# Patient Record
Sex: MALE | Race: OTHER | HISPANIC OR LATINO | URBAN - METROPOLITAN AREA
[De-identification: names, ages, dates, MRNs, and addresses within clinical notes are randomized per-mention and may not be internally consistent; named-entity substitution may affect disease eponyms.]

---

## 2017-03-25 ENCOUNTER — EMERGENCY (EMERGENCY)
Facility: HOSPITAL | Age: 32
LOS: 1 days | Discharge: HOME | End: 2017-03-25
Attending: EMERGENCY MEDICINE
Payer: COMMERCIAL

## 2017-03-25 VITALS
HEART RATE: 70 BPM | OXYGEN SATURATION: 98 % | RESPIRATION RATE: 16 BRPM | SYSTOLIC BLOOD PRESSURE: 139 MMHG | DIASTOLIC BLOOD PRESSURE: 72 MMHG

## 2017-03-25 VITALS
RESPIRATION RATE: 16 BRPM | HEART RATE: 77 BPM | DIASTOLIC BLOOD PRESSURE: 98 MMHG | OXYGEN SATURATION: 99 % | SYSTOLIC BLOOD PRESSURE: 158 MMHG | TEMPERATURE: 98 F | WEIGHT: 240.08 LBS | HEIGHT: 69 IN

## 2017-03-25 DIAGNOSIS — M25.512 PAIN IN LEFT SHOULDER: ICD-10-CM

## 2017-03-25 DIAGNOSIS — Z85.47 PERSONAL HISTORY OF MALIGNANT NEOPLASM OF TESTIS: Chronic | ICD-10-CM

## 2017-03-25 DIAGNOSIS — R07.89 OTHER CHEST PAIN: ICD-10-CM
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
## 2017-03-25 PROCEDURE — 96372 THER/PROPH/DIAG INJ SC/IM: CPT

## 2017-03-25 PROCEDURE — 99284 EMERGENCY DEPT VISIT MOD MDM: CPT

## 2017-03-25 PROCEDURE — 73030 X-RAY EXAM OF SHOULDER: CPT

## 2017-03-25 PROCEDURE — 99284 EMERGENCY DEPT VISIT MOD MDM: CPT | Mod: 25

## 2017-03-25 PROCEDURE — 72100 X-RAY EXAM L-S SPINE 2/3 VWS: CPT

## 2017-03-25 PROCEDURE — 93005 ELECTROCARDIOGRAM TRACING: CPT

## 2017-03-25 PROCEDURE — 72100 X-RAY EXAM L-S SPINE 2/3 VWS: CPT | Mod: 26

## 2017-03-25 PROCEDURE — 71020: CPT | Mod: 26

## 2017-03-25 PROCEDURE — 71046 X-RAY EXAM CHEST 2 VIEWS: CPT

## 2017-03-25 PROCEDURE — 73030 X-RAY EXAM OF SHOULDER: CPT | Mod: 26,LT

## 2017-03-25 PROCEDURE — 93010 ELECTROCARDIOGRAM REPORT: CPT

## 2017-03-25 RX ORDER — CYCLOBENZAPRINE HYDROCHLORIDE 10 MG/1
10 TABLET, FILM COATED ORAL ONCE
Qty: 0 | Refills: 0 | Status: COMPLETED | OUTPATIENT
Start: 2017-03-25 | End: 2017-03-25

## 2017-03-25 RX ORDER — IBUPROFEN 200 MG
1 TABLET ORAL
Qty: 18 | Refills: 0 | OUTPATIENT
Start: 2017-03-25 | End: 2017-03-30

## 2017-03-25 RX ORDER — KETOROLAC TROMETHAMINE 30 MG/ML
30 SYRINGE (ML) INJECTION ONCE
Qty: 0 | Refills: 0 | Status: DISCONTINUED | OUTPATIENT
Start: 2017-03-25 | End: 2017-03-25

## 2017-03-25 RX ORDER — METHOCARBAMOL 500 MG/1
2 TABLET, FILM COATED ORAL
Qty: 30 | Refills: 0 | OUTPATIENT
Start: 2017-03-25 | End: 2017-03-30

## 2017-03-25 RX ORDER — IBUPROFEN 200 MG
600 TABLET ORAL ONCE
Qty: 0 | Refills: 0 | Status: DISCONTINUED | OUTPATIENT
Start: 2017-03-25 | End: 2017-03-25

## 2017-03-25 RX ADMIN — Medication 30 MILLIGRAM(S): at 20:06

## 2017-03-25 RX ADMIN — CYCLOBENZAPRINE HYDROCHLORIDE 10 MILLIGRAM(S): 10 TABLET, FILM COATED ORAL at 20:06
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
## 2017-03-25 NOTE — ED PROVIDER NOTE - PROGRESS NOTE DETAILS
recd sign out  xray reviewed, discussed results with patient, recd meds, discussed with patient results, will have increase in pain. meds written he and family unable to decide pharmacy, hence rx written, will find a 24h pharmacy closer to home. follow up w ith medical doctor
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
## 2017-03-25 NOTE — ED PROVIDER NOTE - MUSCULOSKELETAL MINIMAL EXAM
mild tenderness to palpation of left chest and shoulder.  FAROM.  no visible seatbelt sign/normal range of motion/TENDERNESS

## 2017-05-22 ENCOUNTER — EMERGENCY (EMERGENCY)
Facility: HOSPITAL | Age: 32
LOS: 1 days | Discharge: HOME | End: 2017-05-22
Attending: STUDENT IN AN ORGANIZED HEALTH CARE EDUCATION/TRAINING PROGRAM
Payer: COMMERCIAL

## 2017-05-22 VITALS — WEIGHT: 250 LBS | HEIGHT: 69 IN

## 2017-05-22 DIAGNOSIS — Z85.47 PERSONAL HISTORY OF MALIGNANT NEOPLASM OF TESTIS: Chronic | ICD-10-CM

## 2017-05-22 DIAGNOSIS — R51 HEADACHE: ICD-10-CM

## 2017-05-22 DIAGNOSIS — Z88.8 ALLERGY STATUS TO OTHER DRUGS, MEDICAMENTS AND BIOLOGICAL SUBSTANCES STATUS: ICD-10-CM

## 2017-05-22 DIAGNOSIS — Z98.890 OTHER SPECIFIED POSTPROCEDURAL STATES: ICD-10-CM

## 2017-05-22 DIAGNOSIS — Z79.899 OTHER LONG TERM (CURRENT) DRUG THERAPY: ICD-10-CM

## 2017-05-22 PROCEDURE — 99284 EMERGENCY DEPT VISIT MOD MDM: CPT | Mod: 25

## 2017-05-22 NOTE — ED ADULT TRIAGE NOTE - CHIEF COMPLAINT QUOTE
c/o pressure and pain to back of head with a "tingling " sensation to lower lip, pt states it is the "worst headache" he has ever had. pt states he had pain earlier today and took tylenol

## 2017-05-23 VITALS
RESPIRATION RATE: 18 BRPM | TEMPERATURE: 98 F | DIASTOLIC BLOOD PRESSURE: 82 MMHG | SYSTOLIC BLOOD PRESSURE: 121 MMHG | OXYGEN SATURATION: 97 % | HEART RATE: 67 BPM

## 2017-05-23 LAB
ALBUMIN SERPL ELPH-MCNC: 4.1 G/DL — SIGNIFICANT CHANGE UP (ref 3.3–5.2)
ALP SERPL-CCNC: 52 U/L — SIGNIFICANT CHANGE UP (ref 40–120)
ALT FLD-CCNC: 31 U/L — SIGNIFICANT CHANGE UP
ANION GAP SERPL CALC-SCNC: 14 MMOL/L — SIGNIFICANT CHANGE UP (ref 5–17)
AST SERPL-CCNC: 19 U/L — SIGNIFICANT CHANGE UP
BASOPHILS # BLD AUTO: 0 K/UL — SIGNIFICANT CHANGE UP (ref 0–0.2)
BASOPHILS NFR BLD AUTO: 0.1 % — SIGNIFICANT CHANGE UP (ref 0–2)
BILIRUB SERPL-MCNC: 0.3 MG/DL — LOW (ref 0.4–2)
BUN SERPL-MCNC: 20 MG/DL — SIGNIFICANT CHANGE UP (ref 8–20)
CALCIUM SERPL-MCNC: 8.8 MG/DL — SIGNIFICANT CHANGE UP (ref 8.6–10.2)
CHLORIDE SERPL-SCNC: 103 MMOL/L — SIGNIFICANT CHANGE UP (ref 98–107)
CO2 SERPL-SCNC: 24 MMOL/L — SIGNIFICANT CHANGE UP (ref 22–29)
CREAT SERPL-MCNC: 1.04 MG/DL — SIGNIFICANT CHANGE UP (ref 0.5–1.3)
EOSINOPHIL # BLD AUTO: 0.3 K/UL — SIGNIFICANT CHANGE UP (ref 0–0.5)
EOSINOPHIL NFR BLD AUTO: 2.7 % — SIGNIFICANT CHANGE UP (ref 0–6)
GLUCOSE SERPL-MCNC: 115 MG/DL — SIGNIFICANT CHANGE UP (ref 70–115)
HCT VFR BLD CALC: 38.4 % — LOW (ref 42–52)
HGB BLD-MCNC: 13.6 G/DL — LOW (ref 14–18)
LIDOCAIN IGE QN: 24 U/L — SIGNIFICANT CHANGE UP (ref 22–51)
LYMPHOCYTES # BLD AUTO: 1.9 K/UL — SIGNIFICANT CHANGE UP (ref 1–4.8)
LYMPHOCYTES # BLD AUTO: 19.5 % — LOW (ref 20–55)
MCHC RBC-ENTMCNC: 29.1 PG — SIGNIFICANT CHANGE UP (ref 27–31)
MCHC RBC-ENTMCNC: 35.4 G/DL — SIGNIFICANT CHANGE UP (ref 32–36)
MCV RBC AUTO: 82.2 FL — SIGNIFICANT CHANGE UP (ref 80–94)
MONOCYTES # BLD AUTO: 0.8 K/UL — SIGNIFICANT CHANGE UP (ref 0–0.8)
MONOCYTES NFR BLD AUTO: 8.7 % — SIGNIFICANT CHANGE UP (ref 3–10)
NEUTROPHILS # BLD AUTO: 6.7 K/UL — SIGNIFICANT CHANGE UP (ref 1.8–8)
NEUTROPHILS NFR BLD AUTO: 68.8 % — SIGNIFICANT CHANGE UP (ref 37–73)
PLATELET # BLD AUTO: 226 K/UL — SIGNIFICANT CHANGE UP (ref 150–400)
POTASSIUM SERPL-MCNC: 3.6 MMOL/L — SIGNIFICANT CHANGE UP (ref 3.5–5.3)
POTASSIUM SERPL-SCNC: 3.6 MMOL/L — SIGNIFICANT CHANGE UP (ref 3.5–5.3)
PROT SERPL-MCNC: 6.8 G/DL — SIGNIFICANT CHANGE UP (ref 6.6–8.7)
RBC # BLD: 4.67 M/UL — SIGNIFICANT CHANGE UP (ref 4.6–6.2)
RBC # FLD: 12.8 % — SIGNIFICANT CHANGE UP (ref 11–15.6)
SODIUM SERPL-SCNC: 141 MMOL/L — SIGNIFICANT CHANGE UP (ref 135–145)
WBC # BLD: 9.7 K/UL — SIGNIFICANT CHANGE UP (ref 4.8–10.8)
WBC # FLD AUTO: 9.7 K/UL — SIGNIFICANT CHANGE UP (ref 4.8–10.8)

## 2017-05-23 PROCEDURE — 70450 CT HEAD/BRAIN W/O DYE: CPT

## 2017-05-23 PROCEDURE — 83690 ASSAY OF LIPASE: CPT

## 2017-05-23 PROCEDURE — 85027 COMPLETE CBC AUTOMATED: CPT

## 2017-05-23 PROCEDURE — 70450 CT HEAD/BRAIN W/O DYE: CPT | Mod: 26

## 2017-05-23 PROCEDURE — 99284 EMERGENCY DEPT VISIT MOD MDM: CPT | Mod: 25

## 2017-05-23 PROCEDURE — 80053 COMPREHEN METABOLIC PANEL: CPT

## 2017-05-23 RX ORDER — IBUPROFEN 200 MG
1 TABLET ORAL
Qty: 12 | Refills: 0 | OUTPATIENT
Start: 2017-05-23 | End: 2017-05-26

## 2017-05-23 RX ORDER — SODIUM CHLORIDE 9 MG/ML
1000 INJECTION INTRAMUSCULAR; INTRAVENOUS; SUBCUTANEOUS ONCE
Qty: 0 | Refills: 0 | Status: COMPLETED | OUTPATIENT
Start: 2017-05-23 | End: 2017-05-23

## 2017-05-23 RX ORDER — SODIUM CHLORIDE 9 MG/ML
3 INJECTION INTRAMUSCULAR; INTRAVENOUS; SUBCUTANEOUS EVERY 8 HOURS
Qty: 0 | Refills: 0 | Status: DISCONTINUED | OUTPATIENT
Start: 2017-05-23 | End: 2017-05-26

## 2017-05-23 RX ADMIN — SODIUM CHLORIDE 1000 MILLILITER(S): 9 INJECTION INTRAMUSCULAR; INTRAVENOUS; SUBCUTANEOUS at 01:14

## 2017-05-23 NOTE — ED PROVIDER NOTE - PROGRESS NOTE DETAILS
Called CT to get urgent CT given abnormal affect. Pt being transferred at this time. Labs and Ct reviewed with results provided to patient. Patient well appearing with resolution of symptoms. unclear cause to recurrent head sensation.

## 2017-05-23 NOTE — ED ADULT NURSE REASSESSMENT NOTE - NS ED NURSE REASSESS COMMENT FT1
Pt recvd lying on stretcher, denies pain, reports feeling stress, denies HA. Safety and comfort measures in place.

## 2017-05-23 NOTE — ED PROVIDER NOTE - OBJECTIVE STATEMENT
A 32 year old male pt with a hx of prostate CA (treated) presents to the ED c/o headache. The pt reports gradually developing a strong headache in the back of his head yesterday afternoon. The pt took Tylenol which helped his headache but states that the pain has been worsening since. He describes the pain as a strong pressure in the back of his head. He denies any prior hx of headaches and has not had any associated nausea or vomiting. Pt denies any FMHx of migraines or chronic headaches. As per the pt's friend accompanying the pt, he was not acting like his normal self later on in the day. Pt denies any lack of sleep last night. No further complaints at this time. A 32 year old male pt with a hx of testicular CA (treated) presents to the ED c/o headache. The pt reports gradually developing a strong headache in the back of his head yesterday afternoon. The pt took Tylenol which helped his headache but states that the pain has been worsening since. He describes the pain as a strong pressure in the back of his head. He denies any prior hx of headaches and has not had any associated nausea or vomiting. Pt denies any FMHx of migraines or chronic headaches. As per the pt's wife, he has not been acting like himself since yesterday afternoon. Pt denies any lack of sleep last night. No further complaints at this time. A 32 year old male pt with a hx of testicular CA (treated) presents to the ED c/o headache. The pt reports gradually developing  headache in the right occipital region of his head yesterday afternoon. The pt took Tylenol which helped his headache but states that the pain has been worsening since. He describes the pain as a strong pressure in the back of his head. He states after taking tyelnol the headache resolved for a couple of hours then returned. He took tylenol again with similar response. He then took advil when the headache returned. He states it has helped. However denies pains but repeated states symptoms are like a dull pressure. He denies any prior personal or family hx of recurrent headaches and has not had any associated neurologic complaints; nausea, vomiting, paresthesia, weakness, tremors, etc. Given history of testicular cancer patient and wife and concerned of new onset of headache. Currently the patient states his headache has nearly resolved  No further complaints at this time.

## 2017-05-23 NOTE — ED PROVIDER NOTE - MEDICAL DECISION MAKING DETAILS
A 32 year old male pt presents to the ED c/o headache. Will check labs, give fluids, CT head, and re-evaluate.

## 2017-05-23 NOTE — ED PROVIDER NOTE - CHPI ED SYMPTOMS NEG
no vomiting/no nausea no dizziness/no weakness/no blurred vision/no vomiting/no trauma, no travel, no new medication/no numbness/no fever/no loss of consciousness/no confusion/no nausea

## 2017-05-23 NOTE — ED PROVIDER NOTE - NS ED MD SCRIBE ATTENDING SCRIBE SECTIONS
HISTORY OF PRESENT ILLNESS/PHYSICAL EXAM/PAST MEDICAL/SURGICAL/SOCIAL HISTORY/VITAL SIGNS( Pullset)/REVIEW OF SYSTEMS/DISPOSITION/HIV

## 2019-01-07 NOTE — ED ADULT NURSE NOTE - SCORE
Subjective   Patient ID: Paco is a 68 year old male.    Chief Complaint   Patient presents with   • Follow-up     lab results      HPI     Patient here to follow up chronic conditions and lab results. States still with insomnia even though started taking magnesium.     Paco has a past medical history of Back injury.  Paco has Benign essential hypertension; Insomnia; Obesity (BMI 30-39.9); Low libido; and Prediabetes on their problem list.  Paco has a current medication list which includes the following prescription(s): aspirin, amlodipine, hydrochlorothiazide, and metoprolol tartrate.  Paco is allergic to penicillins.    Review of Systems   Respiratory: Negative for shortness of breath.    Cardiovascular: Negative for chest pain.       Objective   Physical Exam   Constitutional: He appears well-developed and well-nourished. No distress.   HENT:   Head: Normocephalic and atraumatic.   Right Ear: External ear normal.   Left Ear: External ear normal.   Skin: He is not diaphoretic.       Patient Active Problem List   Diagnosis   • Benign essential hypertension   • Insomnia   • Obesity (BMI 30-39.9)   • Low libido   • Prediabetes       Assessment   Problem List Items Addressed This Visit        Behavioral    Insomnia     Continue magnesium         Low libido     Psychological condition is unchanged.  Continue current treatment regimen.  Psychological condition will be reassessed in 3 months   Pending testosterone results.            Circulatory    Benign essential hypertension - Primary     Hypertension is improving with treatment.  Continue current treatment regimen.  Blood pressure will be reassessed in 3 months.         Relevant Orders    PROSTATE SPECIFIC ANTIGEN       Digestive    Obesity (BMI 30-39.9)     Obesity is worsening.  Discussed the patient's BMI.  The BMI is above average. The patient received dietary education because they have an above normal BMI..  General weight loss/lifestyle  modification strategies discussed (elicit support from others; identify saboteurs; non-food rewards, etc).               Schedule follow up: in 3 months   1

## 2019-09-01 ENCOUNTER — EMERGENCY (EMERGENCY)
Facility: HOSPITAL | Age: 34
LOS: 1 days | Discharge: HOME | End: 2019-09-01
Attending: EMERGENCY MEDICINE
Payer: COMMERCIAL

## 2019-09-01 VITALS
WEIGHT: 223.11 LBS | SYSTOLIC BLOOD PRESSURE: 131 MMHG | TEMPERATURE: 98 F | HEART RATE: 64 BPM | HEIGHT: 69 IN | DIASTOLIC BLOOD PRESSURE: 64 MMHG | RESPIRATION RATE: 18 BRPM | OXYGEN SATURATION: 98 %

## 2019-09-01 DIAGNOSIS — Z85.47 PERSONAL HISTORY OF MALIGNANT NEOPLASM OF TESTIS: Chronic | ICD-10-CM

## 2019-09-01 PROCEDURE — 12041 INTMD RPR N-HF/GENIT 2.5CM/<: CPT

## 2019-09-01 PROCEDURE — 99283 EMERGENCY DEPT VISIT LOW MDM: CPT | Mod: 25

## 2019-09-01 PROCEDURE — 73130 X-RAY EXAM OF HAND: CPT | Mod: 26,LT

## 2019-09-01 PROCEDURE — 73130 X-RAY EXAM OF HAND: CPT

## 2019-09-01 RX ORDER — IBUPROFEN 200 MG
600 TABLET ORAL ONCE
Refills: 0 | Status: COMPLETED | OUTPATIENT
Start: 2019-09-01 | End: 2019-09-01

## 2019-09-01 RX ORDER — AZTREONAM 2 G
1 VIAL (EA) INJECTION
Qty: 14 | Refills: 0
Start: 2019-09-01 | End: 2019-09-07

## 2019-09-01 RX ADMIN — Medication 1 TABLET(S): at 21:10

## 2019-09-01 RX ADMIN — Medication 600 MILLIGRAM(S): at 20:29

## 2019-09-01 NOTE — ED STATDOCS - CLINICAL SUMMARY MEDICAL DECISION MAKING FREE TEXT BOX
Laceration to left hand, neuro vascular intact. Check xray, clean out laceration, explore wound, lac repair

## 2019-09-01 NOTE — ED STATDOCS - ATTENDING CONTRIBUTION TO CARE
I, Deann Cardoso, performed a face to face bedside interview with this patient regarding history of present illness, review of symptoms and relevant past medical, social and family history.  I completed an independent physical examination. Medical decision making, follow-up on ordered tests (ie labs, radiologic studies) and re-evaluation of the patient's status has been communicated to the ACP.  Disposition of the patient will be based on test outcome and response to ED interventions.

## 2019-09-01 NOTE — ED STATDOCS - OBJECTIVE STATEMENT
33y/o M presents to the ED s/p cutting frozen burgers with a knife c/o laceration to left hand. Pt is able to move all extremities, bleeding controlled. Tetanus UTD. No additional complaints at this time.

## 2019-09-01 NOTE — ED STATDOCS - PHYSICAL EXAMINATION
Gen: NAD, AOx3  Head: NCAT  MSK: No edema, no visible deformities, full range of motion in all digits of left hand. normal strength of left hand and left fingers. Sensation intact.  Neuro: CN II-XII grossly intact, No focal neurologic deficits  Skin: Laceration to left hand between 1st and 2nd digit not actively bleeding.  Psych: normal affect Gen: NAD, AOx3  Head: NCAT  MSK: No edema, no visible deformities, full range of motion in all digits of left hand. normal strength of left hand and left fingers. Sensation intact.  Neuro: No focal neurologic deficits  Skin: deep Laceration to left hand between 1st and 2nd digit not actively bleeding, no tendon involvement upon wound exploration.  Psych: normal affect

## 2019-09-01 NOTE — ED ADULT NURSE NOTE - OBJECTIVE STATEMENT
pt A&OX4, c/o lac to left hand from knife, pt states that he was trying to break apart frozen hamburgers with a knife and knife slipped and cut his hand

## 2019-09-01 NOTE — ED STATDOCS - DATE/TIME 1
Incoming fax from pharmacy stating    Alternative requested: please consider changing the directions to be used nightly. This way 1 tube will last 30 days and thus will be much cheaper for the pt.       01-Sep-2019 21:06

## 2019-09-01 NOTE — ED STATDOCS - PATIENT PORTAL LINK FT
You can access the FollowMyHealth Patient Portal offered by [facility] by registering at the following website: [email or web address]. By joining [employer]’s FollowMyHealth portal, you will also be able to view your health information using other applications (apps) compatible with our system.

## 2019-09-01 NOTE — ED STATDOCS - PROGRESS NOTE DETAILS
JANETTE PETERSON: Suture repaired. Will treat prophylactically with PO bactrim. Pt advised to follow up with Dr. Shukla ( hand sx ). Strict return instructions given.

## 2019-09-02 PROBLEM — C62.91 MALIGNANT NEOPLASM OF RIGHT TESTIS, UNSPECIFIED WHETHER DESCENDED OR UNDESCENDED: Chronic | Status: ACTIVE | Noted: 2017-03-25

## 2019-09-14 ENCOUNTER — EMERGENCY (EMERGENCY)
Facility: HOSPITAL | Age: 34
LOS: 1 days | Discharge: HOME | End: 2019-09-14
Attending: EMERGENCY MEDICINE
Payer: COMMERCIAL

## 2019-09-14 VITALS
TEMPERATURE: 98 F | SYSTOLIC BLOOD PRESSURE: 131 MMHG | OXYGEN SATURATION: 98 % | DIASTOLIC BLOOD PRESSURE: 84 MMHG | HEART RATE: 68 BPM | HEIGHT: 69 IN | RESPIRATION RATE: 18 BRPM | WEIGHT: 223.11 LBS

## 2019-09-14 DIAGNOSIS — Z85.47 PERSONAL HISTORY OF MALIGNANT NEOPLASM OF TESTIS: Chronic | ICD-10-CM

## 2019-09-14 PROCEDURE — 99283 EMERGENCY DEPT VISIT LOW MDM: CPT

## 2019-09-14 RX ADMIN — Medication 300 MILLIGRAM(S): at 22:27

## 2019-09-14 NOTE — ED ADULT TRIAGE NOTE - CHIEF COMPLAINT QUOTE
I had stitches in left hand, removed them today, self removal,  reports I think its infected, reprots going to Urgent care and being sent to ED, presents with pus crusted wound between left thumb and 2nd digit, denies pain

## 2019-09-15 NOTE — ED PROVIDER NOTE - PATIENT PORTAL LINK FT
You can access the FollowMyHealth Patient Portal offered by [facility] by registering at the following website: [email or web address]. By joining [employer]’s FollowMyHealth portal, you will also be able to view your health information using other applications (apps) compatible with our system.

## 2019-09-15 NOTE — ED PROVIDER NOTE - OBJECTIVE STATEMENT
34 yr old M presented to ED with wound infection . Pt states that he had laceration repair x 2 weeks ago and was told to return to suture removal but he did not. Pt explained that he removed the sutures by himself yesterday and noticed drainage and redness so he decided to come to the ED for evaluation. Pt denies any fever, chills or warmth to the area of his wound. Pt denies any other issues at this time.

## 2019-09-15 NOTE — ED PROVIDER NOTE - ATTENDING CONTRIBUTION TO CARE
I personally saw the patient with the PA, and completed the key components of the history and physical exam. I then discussed the management plan with the PA.    PT has dehisced wound to webspace between thumb and index finger  minimal blanching erythema; no purulence; granulation tissue present  No crepitance; pt with full ROM digits.     Pt has been cleansing wound with hydrogen peroxide leading to wound dehiscence.  Instructed to stop hydrogen peroxide use, use antibiotics and observe basic wound care

## 2019-09-15 NOTE — ED PROVIDER NOTE - PROGRESS NOTE DETAILS
Wound cleaned with Betadine. Bacitracin applied and hand wrapped . Pt Tx with Clindamycin and Rx sent to Pharmacy.

## 2019-09-15 NOTE — ED PROVIDER NOTE - CARE PLAN
Principal Discharge DX:	Wound  Assessment and plan of treatment:	Continue with antibiotics and F/U with PCP

## 2019-09-15 NOTE — ED PROVIDER NOTE - CLINICAL SUMMARY MEDICAL DECISION MAKING FREE TEXT BOX
34 yr old M presented to ED with wound infection . Pt states that he had laceration repair x 2 weeks ago and was told to return to suture removal but he did not.

## 2023-06-07 ENCOUNTER — EMERGENCY (EMERGENCY)
Facility: HOSPITAL | Age: 38
LOS: 1 days | Discharge: HOME | End: 2023-06-07
Attending: EMERGENCY MEDICINE
Payer: COMMERCIAL

## 2023-06-07 VITALS
OXYGEN SATURATION: 96 % | RESPIRATION RATE: 19 BRPM | SYSTOLIC BLOOD PRESSURE: 148 MMHG | WEIGHT: 253.97 LBS | DIASTOLIC BLOOD PRESSURE: 94 MMHG | HEART RATE: 98 BPM | TEMPERATURE: 103 F

## 2023-06-07 VITALS — TEMPERATURE: 102 F

## 2023-06-07 DIAGNOSIS — Z85.47 PERSONAL HISTORY OF MALIGNANT NEOPLASM OF TESTIS: Chronic | ICD-10-CM

## 2023-06-07 LAB
RAPID RVP RESULT: SIGNIFICANT CHANGE UP
S PYO DNA THROAT QL NAA+PROBE: SIGNIFICANT CHANGE UP
SARS-COV-2 RNA SPEC QL NAA+PROBE: SIGNIFICANT CHANGE UP

## 2023-06-07 PROCEDURE — 99284 EMERGENCY DEPT VISIT MOD MDM: CPT

## 2023-06-07 PROCEDURE — 87798 DETECT AGENT NOS DNA AMP: CPT

## 2023-06-07 PROCEDURE — 87651 STREP A DNA AMP PROBE: CPT

## 2023-06-07 PROCEDURE — 0225U NFCT DS DNA&RNA 21 SARSCOV2: CPT

## 2023-06-07 PROCEDURE — 99283 EMERGENCY DEPT VISIT LOW MDM: CPT

## 2023-06-07 RX ORDER — METHOCARBAMOL 500 MG/1
1500 TABLET, FILM COATED ORAL ONCE
Refills: 0 | Status: COMPLETED | OUTPATIENT
Start: 2023-06-07 | End: 2023-06-07

## 2023-06-07 RX ORDER — IBUPROFEN 200 MG
600 TABLET ORAL ONCE
Refills: 0 | Status: COMPLETED | OUTPATIENT
Start: 2023-06-07 | End: 2023-06-07

## 2023-06-07 RX ADMIN — METHOCARBAMOL 1500 MILLIGRAM(S): 500 TABLET, FILM COATED ORAL at 14:00

## 2023-06-07 RX ADMIN — Medication 600 MILLIGRAM(S): at 13:59

## 2023-06-07 NOTE — ED ADULT TRIAGE NOTE - CHIEF COMPLAINT QUOTE
Pt c/o pain to right mid back, headache, chills, fever since yesterday.  Pt taking tylenol with partial relief; last dose 20 minutes pta.  Child at home sick with cough and strep.

## 2023-06-07 NOTE — ED PROVIDER NOTE - PATIENT PORTAL LINK FT
You can access the FollowMyHealth Patient Portal offered by [facility] by registering at the following website: [email or web address]. By joining [employer]’s FollowMyHealth portal, you will also be able to view your health information using other applications (apps) compatible with our system.

## 2023-06-07 NOTE — ED PROVIDER NOTE - OBJECTIVE STATEMENT
39 yo male with no pmhx presents with fever, chills, body aches, and H/A since yesterday.   Pt states that he developed a fever this morning, 103F. Took tylenol 20 minutes PTA (650 mg).   Pt also endorsing rt sided back pain x6 mo. Denies trauma, injury or falls.   Denies sore throat, cough, nasal congestion, cp, palpitations, sob, matos, abd pain, n/v/c/d, dysuria, hematuria, paresthesias in the extremities, rashes. 39 yo male with pmhx testicular cancer (in remission) presents with fever, chills, body aches, and H/A since yesterday. Pt states that he developed a fever this morning, 103F. Took tylenol 20 minutes PTA (650 mg). States that his wife and son are also sick at home with similar symptoms, son has strep throat. Locates the h/a to the frontal area without radiation, describes it as dull and achy. not worst h/a of life, improvement with tylenol. Pt also endorsing rt sided back pain x6 mo after heavy lifting. Denies recent trauma, injury or falls. Denies radiation of the pain. Never f/u with ortho. Denies sore throat, cough, nasal congestion, cp, palpitations, sob, matos, abd pain, n/v/c/d, dysuria, hematuria, paresthesias in the extremities, saddle paresthesias, urinary/bowel incontinence, rashes. denies smoking hx, illicit drug use, or etoh use.

## 2023-06-07 NOTE — ED ADULT NURSE NOTE - OBJECTIVE STATEMENT
Pt c/o fever and generalized body aches that started yesterday. Pt c/o head pressure. Denies SOB, denies cough.

## 2023-06-07 NOTE — ED PROVIDER NOTE - CLINICAL SUMMARY MEDICAL DECISION MAKING FREE TEXT BOX
Pt well appearing, in NAD, non-toxic appearing. Not hypoxic. No tachypnea, lungs clear on exam. I had lengthy discussion with patient  regarding their symptoms, provided re-assurance and educated pt on strict return precautions. Pt educated on proper supportive care. Stable for discharge home. 37 yo male with pmhx testicular cancer (in remission) presents with fever, chills, body aches, and H/A since yesterday. Pt's other family members sick at home with similar symptoms. Pt well appearing, in NAD, non-toxic appearing. Not hypoxic. No tachypnea, lungs clear on exam. I had lengthy discussion with patient  regarding their symptoms, provided re-assurance and educated pt on strict return precautions. Pt educated on proper supportive care. Stable for discharge home. 39 yo male with pmhx testicular cancer (in remission) presents with fever, chills, body aches, and H/A since yesterday. Pt's other family members sick at home with similar symptoms. Likely viral syndrome. RVP and strep test performed. Meds given, Pt well appearing, in NAD, non-toxic appearing. Not hypoxic. No tachypnea, lungs clear on exam. I had lengthy discussion with patient  regarding their symptoms, provided re-assurance and educated pt on strict return precautions. Pt educated on proper supportive care. Stable for discharge home.

## 2023-06-07 NOTE — ED PROVIDER NOTE - NSFOLLOWUPINSTRUCTIONS_ED_ALL_ED_FT
- Please follow-up with your primary care doctor in the next 5-7 days.  Please call tomorrow for an appointment.  If you cannot follow-up with your primary care doctor please return to the ED for any urgent issues.  - If you have any worsening of symptoms or any other concerns please return to the ED immediately.  - Please continue taking your home medications as directed.     Viral Respiratory Infection    A viral respiratory infection is an illness that affects parts of the body used for breathing, like the lungs, nose, and throat. It is caused by a germ called a virus. Symptoms can include runny nose, coughing, sneezing, fatigue, body aches, sore throat, fever, or headache. Over the counter medicine can be used to manage the symptoms but the infection typically goes away on its own in 5 to 10 days.     SEEK IMMEDIATE MEDICAL CARE IF YOU HAVE ANY OF THE FOLLOWING SYMPTOMS: shortness of breath, chest pain, fever over 10 days, or lightheadedness/dizziness.

## 2023-06-07 NOTE — ED PROVIDER NOTE - PHYSICAL EXAMINATION
Gen: No acute distress, non toxic  HEENT: Mucous membranes moist, pink conjunctivae, EOMI no nystagmus. PERRL. Airway patent, mild erythema in the posterior pharynx without exudates.   Neck: FROM. Neck supple. no cervical/submandibular LAD.   CV: RRR, nl s1/s2.  Resp: CTAB, normal rate and effort. No wheezes, rhonchi or crackles   GI: Abdomen soft, NT, ND. No rebound, no guarding. no ecchymosis  : No CVAT b/l  Neuro: A&O x 3, moving all 4 extremities. no fnd's.   MSK: No midline spinal ttp. +ttp to the rt paraspinal lower lumbar area. no visualized or palpable deformities. FROM UE and LE b/l. compartments soft/compressible. FWB and ambulating.   Skin: No rashes. intact and perfused. cap refill <2sec  Vascular: Radial and dorsalis pedal pulses 2+ b/l.

## 2025-06-10 NOTE — ED PROVIDER NOTE - CROS ED CARDIOVAS ALL NEG
[facility]  6/10/2025    Jr Arroyo (:  1998) is a 26 y.o. male, here for evaluation of the following medical concerns:    Chief Complaint   Patient presents with    Discuss Labs     Wants to be tested for · Total Testosterone  · Free Testosterone  · Prolactin  · LH  · TSH  · Free T3 and Free T4  · Vitamin D  · CBC and CMP      Sexual Problem     Low sex drive         ASSESSMENT/ PLAN  1. Low libido  Uncontrolled, this is a new problem.  Likely psychogenic, but may be side effect of Cymbalta that he is weaning off of.  Will check labs per patient request today.  Reassess when he has discontinued Cymbalta.  Mood otherwise well-controlled.  - Comprehensive Metabolic Panel; Future  - CBC with Auto Differential; Future  - Testosterone, free, total; Future  - Prolactin; Future  - Luteinizing Hormone; Future  - Vitamin D 25 Hydroxy; Future  - TSH; Future  - FREE T4; Future  - T3, Free; Future    2. Anxiety  As above     >30 minutes spent on chart review, care coordination and patient counseling regarding disease state, lifestyle modifications and/or health maintenance screening.       Return if symptoms worsen or fail to improve.    HPI  Patient presents today for concerns of low libido.    States that he became concerned that most other guys his age are more interested in sex.  No concerns when he is masturbating.  States that he mainly has trouble obtaining an erection.  No difficulty sustaining an erection or ejaculating.  States his brother had some additional blood work done for this concern and he is requesting the same.  No uncontrolled anxiety or depression-he is in the process of weaning off of Cymbalta.  He has 2 doses left for taper.    ROS  Review of Systems   Constitutional:  Negative for activity change and unexpected weight change.   HENT:  Negative for tinnitus.    Eyes:  Negative for visual disturbance.   Respiratory:  Negative for shortness of breath and wheezing.   negative...